# Patient Record
Sex: MALE | Race: BLACK OR AFRICAN AMERICAN
[De-identification: names, ages, dates, MRNs, and addresses within clinical notes are randomized per-mention and may not be internally consistent; named-entity substitution may affect disease eponyms.]

---

## 2019-02-22 ENCOUNTER — HOSPITAL ENCOUNTER (INPATIENT)
Dept: HOSPITAL 35 - ER | Age: 69
LOS: 11 days | Discharge: NURSING, REHAB OR LONG-TERM CARE | DRG: 239 | End: 2019-03-05
Attending: INTERNAL MEDICINE | Admitting: INTERNAL MEDICINE
Payer: COMMERCIAL

## 2019-02-22 VITALS — SYSTOLIC BLOOD PRESSURE: 122 MMHG | DIASTOLIC BLOOD PRESSURE: 64 MMHG

## 2019-02-22 VITALS — HEIGHT: 67.99 IN | WEIGHT: 170 LBS | BODY MASS INDEX: 25.76 KG/M2

## 2019-02-22 VITALS — DIASTOLIC BLOOD PRESSURE: 56 MMHG | SYSTOLIC BLOOD PRESSURE: 127 MMHG

## 2019-02-22 VITALS — DIASTOLIC BLOOD PRESSURE: 81 MMHG | SYSTOLIC BLOOD PRESSURE: 118 MMHG

## 2019-02-22 DIAGNOSIS — D63.8: ICD-10-CM

## 2019-02-22 DIAGNOSIS — Z79.899: ICD-10-CM

## 2019-02-22 DIAGNOSIS — L97.518: ICD-10-CM

## 2019-02-22 DIAGNOSIS — M86.8X7: ICD-10-CM

## 2019-02-22 DIAGNOSIS — E11.22: ICD-10-CM

## 2019-02-22 DIAGNOSIS — Z89.612: ICD-10-CM

## 2019-02-22 DIAGNOSIS — E11.621: ICD-10-CM

## 2019-02-22 DIAGNOSIS — I70.201: ICD-10-CM

## 2019-02-22 DIAGNOSIS — E43: ICD-10-CM

## 2019-02-22 DIAGNOSIS — E78.5: ICD-10-CM

## 2019-02-22 DIAGNOSIS — Z79.82: ICD-10-CM

## 2019-02-22 DIAGNOSIS — N17.9: ICD-10-CM

## 2019-02-22 DIAGNOSIS — G92: ICD-10-CM

## 2019-02-22 DIAGNOSIS — E11.40: ICD-10-CM

## 2019-02-22 DIAGNOSIS — N18.9: ICD-10-CM

## 2019-02-22 DIAGNOSIS — E11.65: ICD-10-CM

## 2019-02-22 DIAGNOSIS — Z79.4: ICD-10-CM

## 2019-02-22 DIAGNOSIS — Z87.891: ICD-10-CM

## 2019-02-22 DIAGNOSIS — E11.52: Primary | ICD-10-CM

## 2019-02-22 DIAGNOSIS — E11.69: ICD-10-CM

## 2019-02-22 DIAGNOSIS — I12.9: ICD-10-CM

## 2019-02-22 LAB
ALBUMIN SERPL-MCNC: 1.7 G/DL (ref 3.4–5)
ALT SERPL-CCNC: 9 U/L (ref 30–65)
ANION GAP SERPL CALC-SCNC: 5 MMOL/L (ref 7–16)
AST SERPL-CCNC: 16 U/L (ref 15–37)
BASOPHILS NFR BLD AUTO: 2.8 % (ref 0–2)
BILIRUB SERPL-MCNC: 0.2 MG/DL
BUN SERPL-MCNC: 20 MG/DL (ref 7–18)
CALCIUM SERPL-MCNC: 8.9 MG/DL (ref 8.5–10.1)
CHLORIDE SERPL-SCNC: 104 MMOL/L (ref 98–107)
CO2 SERPL-SCNC: 31 MMOL/L (ref 21–32)
CREAT SERPL-MCNC: 1.4 MG/DL (ref 0.7–1.3)
EOSINOPHIL NFR BLD: 2 % (ref 0–3)
ERYTHROCYTE [DISTWIDTH] IN BLOOD BY AUTOMATED COUNT: 17.2 % (ref 10.5–14.5)
GLUCOSE SERPL-MCNC: 188 MG/DL (ref 74–106)
GRANULOCYTES NFR BLD MANUAL: 59 % (ref 36–66)
HCT VFR BLD CALC: 25.8 % (ref 42–52)
HGB BLD-MCNC: 8.8 GM/DL (ref 14–18)
LYMPHOCYTES NFR BLD AUTO: 29.8 % (ref 24–44)
MCH RBC QN AUTO: 30.2 PG (ref 26–34)
MCHC RBC AUTO-ENTMCNC: 34.1 G/DL (ref 28–37)
MCV RBC: 88.5 FL (ref 80–100)
MONOCYTES NFR BLD: 6.4 % (ref 1–8)
NEUTROPHILS # BLD: 5 THOU/UL (ref 1.4–8.2)
PLATELET # BLD: 382 THOU/UL (ref 150–400)
POTASSIUM SERPL-SCNC: 3.8 MMOL/L (ref 3.5–5.1)
PROT SERPL-MCNC: 8.2 G/DL (ref 6.4–8.2)
RBC # BLD AUTO: 2.91 MIL/UL (ref 4.5–6)
SODIUM SERPL-SCNC: 140 MMOL/L (ref 136–145)
WBC # BLD AUTO: 8.5 THOU/UL (ref 4–11)

## 2019-02-22 PROCEDURE — 50101: CPT

## 2019-02-22 PROCEDURE — 56527: CPT

## 2019-02-22 PROCEDURE — 57091: CPT

## 2019-02-22 PROCEDURE — 53000 INCISION OF URETHRA: CPT

## 2019-02-22 PROCEDURE — 62900: CPT

## 2019-02-22 PROCEDURE — 10040 EXTRACTION: CPT

## 2019-02-22 PROCEDURE — 51412: CPT

## 2019-02-22 PROCEDURE — 70005: CPT

## 2019-02-22 PROCEDURE — 56524: CPT

## 2019-02-22 PROCEDURE — 50386 REMOVE STENT VIA TRANSURETH: CPT

## 2019-02-22 PROCEDURE — 56525: CPT

## 2019-02-22 PROCEDURE — 62110: CPT

## 2019-02-22 NOTE — NUR
Pt. admitted to the unit from the emergency room accompanied by staff.  He
offers no c/o pain.  Admission assessment and history is completed.
Requesting something to eat and box lunch given.  Bed alarm is on.

## 2019-02-23 VITALS — SYSTOLIC BLOOD PRESSURE: 161 MMHG | DIASTOLIC BLOOD PRESSURE: 67 MMHG

## 2019-02-23 VITALS — SYSTOLIC BLOOD PRESSURE: 123 MMHG | DIASTOLIC BLOOD PRESSURE: 55 MMHG

## 2019-02-23 VITALS — DIASTOLIC BLOOD PRESSURE: 58 MMHG | SYSTOLIC BLOOD PRESSURE: 127 MMHG

## 2019-02-23 VITALS — SYSTOLIC BLOOD PRESSURE: 126 MMHG | DIASTOLIC BLOOD PRESSURE: 68 MMHG

## 2019-02-23 LAB
ANION GAP SERPL CALC-SCNC: 6 MMOL/L (ref 7–16)
BUN SERPL-MCNC: 17 MG/DL (ref 7–18)
CALCIUM SERPL-MCNC: 8.5 MG/DL (ref 8.5–10.1)
CHLORIDE SERPL-SCNC: 105 MMOL/L (ref 98–107)
CO2 SERPL-SCNC: 28 MMOL/L (ref 21–32)
CREAT SERPL-MCNC: 1.3 MG/DL (ref 0.7–1.3)
ERYTHROCYTE [DISTWIDTH] IN BLOOD BY AUTOMATED COUNT: 17.3 % (ref 10.5–14.5)
GLUCOSE SERPL-MCNC: 248 MG/DL (ref 74–106)
HCT VFR BLD CALC: 24.6 % (ref 42–52)
HGB BLD-MCNC: 8 GM/DL (ref 14–18)
INR PPP: 1.2
MCH RBC QN AUTO: 28.9 PG (ref 26–34)
MCHC RBC AUTO-ENTMCNC: 32.4 G/DL (ref 28–37)
MCV RBC: 89.1 FL (ref 80–100)
PLATELET # BLD: 349 THOU/UL (ref 150–400)
POTASSIUM SERPL-SCNC: 3.9 MMOL/L (ref 3.5–5.1)
PROTHROMBIN TIME: 12.8 SECONDS (ref 9.3–11.4)
RBC # BLD AUTO: 2.76 MIL/UL (ref 4.5–6)
SODIUM SERPL-SCNC: 139 MMOL/L (ref 136–145)
WBC # BLD AUTO: 7 THOU/UL (ref 4–11)

## 2019-02-23 NOTE — NUR
WOUND CARE: WET TO DRY, ABX, KERLIX. PHYSICIAN ASSESSED, DIET GRANTED, PT
DELIGHTED, WATCHING BIG 12 ALL DAY, CATCHING UP W/HEALTHY SNACKS

## 2019-02-24 VITALS — DIASTOLIC BLOOD PRESSURE: 74 MMHG | SYSTOLIC BLOOD PRESSURE: 128 MMHG

## 2019-02-24 VITALS — DIASTOLIC BLOOD PRESSURE: 63 MMHG | SYSTOLIC BLOOD PRESSURE: 124 MMHG

## 2019-02-24 VITALS — DIASTOLIC BLOOD PRESSURE: 67 MMHG | SYSTOLIC BLOOD PRESSURE: 133 MMHG

## 2019-02-24 VITALS — SYSTOLIC BLOOD PRESSURE: 125 MMHG | DIASTOLIC BLOOD PRESSURE: 71 MMHG

## 2019-02-24 LAB
EST. AVERAGE GLUCOSE BLD GHB EST-MCNC: 174 MG/DL
GLYCOHEMOGLOBIN (HGB A1C): 7.7 % (ref 4.8–5.6)

## 2019-02-24 NOTE — NUR
PT ALERT AND ORIENTED TIMES THREE, WITH PERIODS OF CONFUSION. VSS, 98%RA, IVF
INFUSING PER ORDER. DRESSING TO RIGHT FOOT CHANGED THIS SHIFT SMALL AMOUNT OF
BLODDY DRAINAGE NOTED. PT DENIES PAIN/SOA. PT TOLERATES MEDS AND MEALS. WILL
CONTINUE TO MONITOR.

## 2019-02-24 NOTE — NUR
PT DENIED PAIN,N/V SO FAR.REPOSITIONED WHILE IN BED PER PT'S REQUEST.DRSG TO
HIS R FOOT C/D/I.IV FLUID AND IV ABX ADMINISTERED AS ORDERED.PO FLUIDS
ENCOURAGED.URINAL AT BEDSIDE,ADEQUATE OUTPUT NOTED.PT RESTING ON HIS BED AT
THIS TIME.CALL LIGHT WITHIN REACH.

## 2019-02-25 VITALS — DIASTOLIC BLOOD PRESSURE: 73 MMHG | SYSTOLIC BLOOD PRESSURE: 138 MMHG

## 2019-02-25 VITALS — DIASTOLIC BLOOD PRESSURE: 73 MMHG | SYSTOLIC BLOOD PRESSURE: 143 MMHG

## 2019-02-25 VITALS — SYSTOLIC BLOOD PRESSURE: 135 MMHG | DIASTOLIC BLOOD PRESSURE: 76 MMHG

## 2019-02-25 VITALS — DIASTOLIC BLOOD PRESSURE: 66 MMHG | SYSTOLIC BLOOD PRESSURE: 145 MMHG

## 2019-02-25 NOTE — NUR
Assumed care at 1845. Pt resting in bed. Wet to dry dressing applied on wound.
Pictures on chart. Pt denies chest pain. No identified needs. Will continue to
monitor.

## 2019-02-25 NOTE — NUR
PT ADMITTED RELATED TO SOA,AMS. CM REVIEWED CHART AND SPOKE WITH CARE TEAM. CM
MET WITH PT AT BEDSIDE THIS DAY. PT IS A&O X4. CM ROLE INTRODUCED. PT
INDICATED HE HAD BEEN AT [facility]. PT INDICATED HE HAD BEEN
THERE FOR ABOUT 6 WEEKS AND THAT PRIOR TO THAT HE HAD BEEN AT [facility]. PT
INDICATED THAT HE PLANS TO RETURN TO [facility] ONCE MEDICALLY
STABLE. ANANTH WITH [employer] VISITED WITH PT AND CM PROVIDED HER WITH UPDATED
THIS AFTERNOON. CM TO FOLLOW AS INDICATED WITH DC PLANNING.

## 2019-02-25 NOTE — NUR
Assumed pt care this morning, pt was taken down to have an MRI of his right
foot. Pt would be confused at times. Blood sugars done and medication given.
Seen by wound care team, dressing changed. Pt refused to eat his dinner
claiming he does not eat chicken and pork, informed the pm nurse. Gave the pt
a boxed lunch. No issues or complaints verbalized by the pt at this time.

## 2019-02-26 VITALS — SYSTOLIC BLOOD PRESSURE: 130 MMHG | DIASTOLIC BLOOD PRESSURE: 72 MMHG

## 2019-02-26 VITALS — DIASTOLIC BLOOD PRESSURE: 73 MMHG | SYSTOLIC BLOOD PRESSURE: 128 MMHG

## 2019-02-26 VITALS — DIASTOLIC BLOOD PRESSURE: 63 MMHG | SYSTOLIC BLOOD PRESSURE: 124 MMHG

## 2019-02-26 VITALS — DIASTOLIC BLOOD PRESSURE: 64 MMHG | SYSTOLIC BLOOD PRESSURE: 122 MMHG

## 2019-02-26 LAB
ANION GAP SERPL CALC-SCNC: 4 MMOL/L (ref 7–16)
BASOPHILS NFR BLD AUTO: 0.6 % (ref 0–2)
BUN SERPL-MCNC: 13 MG/DL (ref 7–18)
CALCIUM SERPL-MCNC: 8.6 MG/DL (ref 8.5–10.1)
CHLORIDE SERPL-SCNC: 104 MMOL/L (ref 98–107)
CO2 SERPL-SCNC: 29 MMOL/L (ref 21–32)
CREAT SERPL-MCNC: 1.1 MG/DL (ref 0.7–1.3)
EOSINOPHIL NFR BLD: 1.9 % (ref 0–3)
ERYTHROCYTE [DISTWIDTH] IN BLOOD BY AUTOMATED COUNT: 16.7 % (ref 10.5–14.5)
GLOBULIN TOTAL: 4.6 G/DL (ref 2.2–3.9)
GLUCOSE SERPL-MCNC: 122 MG/DL (ref 74–106)
GRANULOCYTES NFR BLD MANUAL: 50.6 % (ref 36–66)
HCT VFR BLD CALC: 26.5 % (ref 42–52)
HGB BLD-MCNC: 8.7 GM/DL (ref 14–18)
LYMPHOCYTES NFR BLD AUTO: 36.6 % (ref 24–44)
MCH RBC QN AUTO: 29 PG (ref 26–34)
MCHC RBC AUTO-ENTMCNC: 32.7 G/DL (ref 28–37)
MCV RBC: 88.8 FL (ref 80–100)
MONOCYTES NFR BLD: 10.3 % (ref 1–8)
NEUTROPHILS # BLD: 3.9 THOU/UL (ref 1.4–8.2)
PLATELET # BLD: 385 THOU/UL (ref 150–400)
POTASSIUM SERPL-SCNC: 3.9 MMOL/L (ref 3.5–5.1)
RBC # BLD AUTO: 2.99 MIL/UL (ref 4.5–6)
SODIUM SERPL-SCNC: 137 MMOL/L (ref 136–145)
WBC # BLD AUTO: 7.7 THOU/UL (ref 4–11)

## 2019-02-26 NOTE — NUR
Pt. rested quietly during the night when checked on during frequent rounds.
He offers no c/o pain.  Dressing to his right lower leg is dry and intact.
Refuses to be turned and repositioned.  Bed alarm is on.

## 2019-02-26 NOTE — NUR
PT STABLE THROUGHOUT SHIFT. PT HAD LAVAGE TREATMENT WHICH HE TOLERATED WELL.
CHANGED DRESSING ON FOOT. PT RESTING COMFORTABLY.

## 2019-02-27 VITALS — SYSTOLIC BLOOD PRESSURE: 135 MMHG | DIASTOLIC BLOOD PRESSURE: 61 MMHG

## 2019-02-27 VITALS — SYSTOLIC BLOOD PRESSURE: 113 MMHG | DIASTOLIC BLOOD PRESSURE: 59 MMHG

## 2019-02-27 VITALS — SYSTOLIC BLOOD PRESSURE: 135 MMHG | DIASTOLIC BLOOD PRESSURE: 75 MMHG

## 2019-02-27 VITALS — DIASTOLIC BLOOD PRESSURE: 72 MMHG | SYSTOLIC BLOOD PRESSURE: 132 MMHG

## 2019-02-27 VITALS — SYSTOLIC BLOOD PRESSURE: 110 MMHG | DIASTOLIC BLOOD PRESSURE: 58 MMHG

## 2019-02-27 VITALS — SYSTOLIC BLOOD PRESSURE: 116 MMHG | DIASTOLIC BLOOD PRESSURE: 67 MMHG

## 2019-02-27 VITALS — SYSTOLIC BLOOD PRESSURE: 129 MMHG | DIASTOLIC BLOOD PRESSURE: 63 MMHG

## 2019-02-27 VITALS — SYSTOLIC BLOOD PRESSURE: 127 MMHG | DIASTOLIC BLOOD PRESSURE: 61 MMHG

## 2019-02-27 VITALS — DIASTOLIC BLOOD PRESSURE: 57 MMHG | SYSTOLIC BLOOD PRESSURE: 125 MMHG

## 2019-02-27 PROCEDURE — B4181ZZ FLUOROSCOPY OF BILATERAL RENAL ARTERIES USING LOW OSMOLAR CONTRAST: ICD-10-PCS | Performed by: RADIOLOGY

## 2019-02-27 PROCEDURE — B41D1ZZ FLUOROSCOPY OF AORTA AND BILATERAL LOWER EXTREMITY ARTERIES USING LOW OSMOLAR CONTRAST: ICD-10-PCS | Performed by: RADIOLOGY

## 2019-02-27 NOTE — NUR
Pt A&Ox4, VSS with no c/o of pain. No SOA or distress observed.
Right foot dressing intact. Left groin
dressing intact and no swelling. Pt left leg is straight and head up at the 30
degree limit. Pt has dinner and is resting. Will continue to monitor.

## 2019-02-27 NOTE — NUR
PATIENT AOX4 MAKES NEEDS KNOWN. PATIENT IS NPO SINCE MIDNIGHT PER DR. REYES.
RIGHT FOOT HAS A STRONG ODOR, DRESSING IS C/D/I. PATIENT DENIED PAIN OR
DISCOMFORT.  PATIENT REFUSED TO BE TURNED, PATIENT REFUSED THIS NURSE TO
REMOVE RIGHT FOOT PROSTHETIC.  PATIENT IN BED ASLEEP AT THIS TIME BREATHING
REGULAR AND UNLABOURED.

## 2019-02-27 NOTE — NUR
PT HAVING ARTERIOGRAM TODAY. CARE TEAM LOOKING AT POSSIBLE BKA FRIDAY. PT WILL
RETURN TO [facility] ONCE MEDICALLY STABLE. CM TO FOLLOW AS
INDICATED WITH DC PLANNING.

## 2019-02-28 VITALS — DIASTOLIC BLOOD PRESSURE: 67 MMHG | SYSTOLIC BLOOD PRESSURE: 127 MMHG

## 2019-02-28 VITALS — DIASTOLIC BLOOD PRESSURE: 65 MMHG | SYSTOLIC BLOOD PRESSURE: 119 MMHG

## 2019-02-28 VITALS — DIASTOLIC BLOOD PRESSURE: 64 MMHG | SYSTOLIC BLOOD PRESSURE: 151 MMHG

## 2019-02-28 VITALS — DIASTOLIC BLOOD PRESSURE: 69 MMHG | SYSTOLIC BLOOD PRESSURE: 139 MMHG

## 2019-02-28 NOTE — NUR
ASSUMED CARE AT 0700. PT A&OX4. PT IS FUSSY TODAY. ORTHO DR GUERRA AND PT
TOLD HER HE DID NOT AGREE THAT HE NEEDED AMPUTATION, PT PROCEEDED TO TELL
WOUND PHYSICIAN THAT HE THOUGHT HE WOULD TRY MORE WOUND CARE AND NO
AMPUTATION. ID PHYSICIAN SPOKE AT LENGTH OF SEVERITY OF WOUNDS AND NEEDS AND
PT STATES HE MAKE A DECISION "SOON". PT DOES NOT WANT TO TURN AND GETS VERY
UPSET WHEN YOU ASK HIM TO. PT REFUSED BATH AFTER MUCH ENCOURAGEMENT.

## 2019-02-28 NOTE — NUR
WOUND CONSULT:
PT. WAS SEEN TODAY BY DR. MONZON AND MYSELF.  PT. HAS A VASCULAR WOUND TO HIS
RIGHT FOOT.  PT. HAS HAD A FAILED TRANSMET AND THERE IS EXPOSED DEAD BONE.
PT. TOE THAT IS LEFT IS DRY, DEAD AND NECROTIC. THE PLANTER SURFACE OF THE
FOOT IS COVER IN SLOUGH AND A FOUL ODOR IS NOTED AT THIS TIME.  PT. HAS
CONFIRED OSTEO ALONG WITH A SIGNIFICANT INFECTION.  ANGIOGRAM WAS ATTEMPTED
YESTERDAY AND THERE IS NOT VIABLE OPTIONS FOR BLOOD RESTORATION TO AID IN
BLOOD FLOW.  PT. HAS BEEN SEEN BY WOUND CARE, ID AND ORTHO.  ALL HAVE
EXPLAINED TO THE PT. THAT HE NEEDS TO HAVE AN AMPUTATION AND PT. AT THIS TIME
IS REFUSING TO HAVE THIS PROCEDURE.  PT. HAS BEEN EXPLAINED ON THE RISK AND
BENIFITS OF HIS CHOICE.
 
RECOMMENDATIONS:
DAKIN'S MOIST GAUZE DRESSING BID AT THIS TIME.
 
PT. AND STAFF NURSE WERE INSTRUCTED ON PLAN OF CARE.

## 2019-03-01 VITALS — DIASTOLIC BLOOD PRESSURE: 60 MMHG | SYSTOLIC BLOOD PRESSURE: 127 MMHG

## 2019-03-01 VITALS — SYSTOLIC BLOOD PRESSURE: 145 MMHG | DIASTOLIC BLOOD PRESSURE: 82 MMHG

## 2019-03-01 VITALS — SYSTOLIC BLOOD PRESSURE: 128 MMHG | DIASTOLIC BLOOD PRESSURE: 56 MMHG

## 2019-03-01 VITALS — SYSTOLIC BLOOD PRESSURE: 142 MMHG | DIASTOLIC BLOOD PRESSURE: 63 MMHG

## 2019-03-01 NOTE — NUR
PT ANGRY AND AGITATED WITH STAFF PT WAS ABLE TO SLEEP ON AND OFF NO COMPLAINTS
OF PAIN PT USED CALL LIGHT EFFECTIVELY.

## 2019-03-01 NOTE — NUR
CALL RECEIVED FROM ANANTH [facility].  REQUESTING CLINICAL UPDATES FOR
PATIENT.  PRINTED AND FAXED TO HER, VERIFIED RECEIVED.  PATIENT IS FROM
[facility], PLAN IS FOR PATIENT TO RETURN ONCE
MEDICALLY READY.  FOLLOWING TO ASSIST WITH DISCHARGE NEED.  UNIT SW AWARE.

## 2019-03-01 NOTE — NUR
Assumed pt care this am, wound care done and dressing changed. Pt refused a
bed bath. Pt agreed to have right bka, consents signed attached to the chart.
Blood sugars done and all medication given as per poc. Pt is to be NPO this
midnight in preparation for the procedure rose. No issues or concerned voiced.
POC followed

## 2019-03-01 NOTE — NUR
WOUND FOLLOW UP:
PT. WAS SEEN TODAY BY DR. VILLALTA AND MYSELF.  PT. IS NOW AGREEABLE TO
PROCEED WITH AN RIGHT BKA.
 
RECOMMENDATIONS:
CONTINUE WITH CURRENT PLAN OF CARE.
 
PT. AND STAFF NURSE WERE INSTRUCTED ON PLAN OF CARE.

## 2019-03-02 VITALS — DIASTOLIC BLOOD PRESSURE: 59 MMHG | SYSTOLIC BLOOD PRESSURE: 145 MMHG

## 2019-03-02 VITALS — DIASTOLIC BLOOD PRESSURE: 72 MMHG | SYSTOLIC BLOOD PRESSURE: 148 MMHG

## 2019-03-02 VITALS — SYSTOLIC BLOOD PRESSURE: 126 MMHG | DIASTOLIC BLOOD PRESSURE: 61 MMHG

## 2019-03-02 VITALS — SYSTOLIC BLOOD PRESSURE: 137 MMHG | DIASTOLIC BLOOD PRESSURE: 69 MMHG

## 2019-03-02 VITALS — SYSTOLIC BLOOD PRESSURE: 131 MMHG | DIASTOLIC BLOOD PRESSURE: 67 MMHG

## 2019-03-02 VITALS — DIASTOLIC BLOOD PRESSURE: 69 MMHG | SYSTOLIC BLOOD PRESSURE: 127 MMHG

## 2019-03-02 VITALS — DIASTOLIC BLOOD PRESSURE: 82 MMHG | SYSTOLIC BLOOD PRESSURE: 155 MMHG

## 2019-03-02 VITALS — SYSTOLIC BLOOD PRESSURE: 153 MMHG | DIASTOLIC BLOOD PRESSURE: 68 MMHG

## 2019-03-02 LAB
ALBUMIN SERPL-MCNC: 1.4 G/DL (ref 3.4–5)
ALT SERPL-CCNC: 11 U/L (ref 30–65)
ANION GAP SERPL CALC-SCNC: 5 MMOL/L (ref 7–16)
AST SERPL-CCNC: 13 U/L (ref 15–37)
BASOPHILS NFR BLD AUTO: 0.5 % (ref 0–2)
BILIRUB SERPL-MCNC: 0.1 MG/DL
BUN SERPL-MCNC: 11 MG/DL (ref 7–18)
CALCIUM SERPL-MCNC: 8.3 MG/DL (ref 8.5–10.1)
CHLORIDE SERPL-SCNC: 106 MMOL/L (ref 98–107)
CO2 SERPL-SCNC: 28 MMOL/L (ref 21–32)
CREAT SERPL-MCNC: 0.9 MG/DL (ref 0.7–1.3)
EOSINOPHIL NFR BLD: 2.4 % (ref 0–3)
ERYTHROCYTE [DISTWIDTH] IN BLOOD BY AUTOMATED COUNT: 17.2 % (ref 10.5–14.5)
GLUCOSE SERPL-MCNC: 108 MG/DL (ref 74–106)
GRANULOCYTES NFR BLD MANUAL: 56.2 % (ref 36–66)
HCT VFR BLD CALC: 24.1 % (ref 42–52)
HGB BLD-MCNC: 8.1 GM/DL (ref 14–18)
INR PPP: 1.2
LYMPHOCYTES NFR BLD AUTO: 31 % (ref 24–44)
MCH RBC QN AUTO: 29.5 PG (ref 26–34)
MCHC RBC AUTO-ENTMCNC: 33.5 G/DL (ref 28–37)
MCV RBC: 88.2 FL (ref 80–100)
MONOCYTES NFR BLD: 9.9 % (ref 1–8)
NEUTROPHILS # BLD: 4 THOU/UL (ref 1.4–8.2)
PLATELET # BLD: 350 THOU/UL (ref 150–400)
POTASSIUM SERPL-SCNC: 3.7 MMOL/L (ref 3.5–5.1)
PROT SERPL-MCNC: 6.9 G/DL (ref 6.4–8.2)
PROTHROMBIN TIME: 12.3 SECONDS (ref 9.3–11.4)
RBC # BLD AUTO: 2.73 MIL/UL (ref 4.5–6)
SODIUM SERPL-SCNC: 139 MMOL/L (ref 136–145)
WBC # BLD AUTO: 7.1 THOU/UL (ref 4–11)

## 2019-03-02 PROCEDURE — 0Y6H0Z3 DETACHMENT AT RIGHT LOWER LEG, LOW, OPEN APPROACH: ICD-10-PCS | Performed by: ORTHOPAEDIC SURGERY

## 2019-03-02 PROCEDURE — 0KUQ07Z SUPPLEMENT RIGHT UPPER LEG MUSCLE WITH AUTOLOGOUS TISSUE SUBSTITUTE, OPEN APPROACH: ICD-10-PCS | Performed by: ORTHOPAEDIC SURGERY

## 2019-03-02 NOTE — NUR
Pt is on NPO since midnight, asleep when received on shift changed. Pt has not
been taken down to OR for the amputation.

## 2019-03-02 NOTE — NUR
A/O, calm and cooperative; vss, afebrile; no n/v. Hg 8.1 this AM. patient is
aware of the coming surgery. NPO after midnight, tolerated well.

## 2019-03-03 VITALS — SYSTOLIC BLOOD PRESSURE: 134 MMHG | DIASTOLIC BLOOD PRESSURE: 44 MMHG

## 2019-03-03 VITALS — DIASTOLIC BLOOD PRESSURE: 72 MMHG | SYSTOLIC BLOOD PRESSURE: 143 MMHG

## 2019-03-03 VITALS — SYSTOLIC BLOOD PRESSURE: 125 MMHG | DIASTOLIC BLOOD PRESSURE: 48 MMHG

## 2019-03-03 VITALS — SYSTOLIC BLOOD PRESSURE: 136 MMHG | DIASTOLIC BLOOD PRESSURE: 67 MMHG

## 2019-03-03 LAB
HCT VFR BLD CALC: 28.2 % (ref 42–52)
HGB BLD-MCNC: 9.6 GM/DL (ref 14–18)

## 2019-03-03 NOTE — NUR
PROGRESS
 
PT ALERT AND RESPONSIVE, VSS IVF'S INFUSING WITHOUT DIFFICULTY. REFUSED PAIN
MEDICATION. REQUESTED POTATO CHIPS SLEPT MOST OF SHIFT. REPOSITIONED SELF.
CONTINUE TO MONITOR

## 2019-03-03 NOTE — NUR
ASSUMED CARE AT 0700. AXOX4. R BKA SITE DRESSING DRY AND INTACT. PER ORTHO EMILY KIM, LEAVE THE DRESSING IN TILL TOMORROW AND ORTHO WILL EVAL AND GIVE
FURTHER RECOMMENDATION FOR CARE. DIET ADVANCED TO HEART HEALTHY.  ID 
D/C JELANI. STILL ON IVF. NO S/S ACUTE DISTRESS NOTED OR REPORTED AT THIS
TIME. WILL CONT TO MONITOR ANY CHANGES.

## 2019-03-04 VITALS — SYSTOLIC BLOOD PRESSURE: 146 MMHG | DIASTOLIC BLOOD PRESSURE: 68 MMHG

## 2019-03-04 VITALS — SYSTOLIC BLOOD PRESSURE: 131 MMHG | DIASTOLIC BLOOD PRESSURE: 56 MMHG

## 2019-03-04 VITALS — SYSTOLIC BLOOD PRESSURE: 145 MMHG | DIASTOLIC BLOOD PRESSURE: 75 MMHG

## 2019-03-04 VITALS — SYSTOLIC BLOOD PRESSURE: 141 MMHG | DIASTOLIC BLOOD PRESSURE: 72 MMHG

## 2019-03-04 NOTE — NUR
PROGRESS
 
UNEVENTFUL NIGHT. PT DENIES PAIN, REFUSED TURNS BUT ATTEMPTED TO REPOSITION
SELF WHEN REQUESTED. IVF'S INFUSING AS ORDERED, HYDRALAZINE HELD D/T LOW BP.
ON RA TELE INTACT CONTINUE POC.

## 2019-03-04 NOTE — NUR
WOUND FOLLOW UP:
PT. WAS SEEN TODAY BY DR. VILLALTA AND MYSELF.  PT. UNDERWENT A RIGHT BKA ON
3/2/19.  PT. SURGICAL DRESSING WAS CHANGED TODAY BY WOUND CARE.  INCSION IS
WELL APPROXIMATED AND FREE OF ANY SIGNS OR SYMPTOMS OF INFECTION AT THIS TIME.
PER ORTHO WOUND CARE WILL PULL THE PINROSE DRAIN TOMORROW.
 
RECOMMENDATIONS:
CONTINUE WITH CURRENT PLAN OF CARE.
 
PT. AND STAFF NURSE WERE INSTRUCTED ON PLAN OF CARE.

## 2019-03-04 NOTE — NUR
PT AND OT TO EVAL PT. CLINICAL UPDATES SENT TO [facility] THIS DAY.
IT IS ANTICPATED THAT PT WILL RETURN TO [facility] FOR SKILLED
REHAB SERVICES ONCE MEDICALLY STABLE. CM TO FOLLOW AS INDICATED WITH DC
PLANNING.

## 2019-03-04 NOTE — NUR
ASSUME PT CARE AT 0700. VSS ON RA. BS MONITOR INSULIN AND B/P MEDICATIONS
GIVEN AT ORDERED. PT IS CONTINUE TO BE ON NS 0.9% 75ML/HR. B/P HAS BEEN ABOUT
141/72 - 140/75. ENCOURAGED PT TO DRINK MORE SO HE DOESN'T NEED TO BE ON
FLUID. PHYSICAL THERAPIST WORKED WITH PT AND GOT PT UP TO RECLINER. NO
TRANSFER EXCEPT PT AT THIS MOMENT. WOUND NURSE CAME TO CHANGE SURGICAL
DRESSING THIS AM.  CAME TO GIVE PT  STUMP. PT IS MORE CALM AND
COOPERATIVE TODAY, BUT REFUSED TAKING BATH BY PCA TODAY. USES URINAL AT
BEDSIDE. HAD ONE INCONT THIS AM WHEN PHYSICAL THERAPIST GOT HIM UP TO
RECLINER. DENIES PAIN. OFFERED SUPPORTIVE CARE. CHANGE DRESSING ON IV ON RIGHT
AC. ENCOURAGED PT TO CHANGE POSITION IN BED. FALL PRECAUTION IN PLACE.
REASSESSMENT PER CHART. LABS REVIEWED. HGB IS BETTER 9.6. CALL LIGHT WITHIN
REACH. ENCOURAGED PT TO VOICE HIS NEEDS. REPORTS SLEPT WELL LAST NIGHT.

## 2019-03-05 VITALS — DIASTOLIC BLOOD PRESSURE: 75 MMHG | SYSTOLIC BLOOD PRESSURE: 140 MMHG

## 2019-03-05 VITALS — DIASTOLIC BLOOD PRESSURE: 79 MMHG | SYSTOLIC BLOOD PRESSURE: 150 MMHG

## 2019-03-05 NOTE — NUR
WOUND FOLLOW UP:
PT. WAS SEEN TODAY BY DR. TOMAS AND MYSELF.  PT. PENROSE DRAIN WAS REMOVED
TODAY BY WOUND CARE.  SURGICAL INCSION IS WELL APPROXIMATED AND PT. TOLERATED
PROCEDURE AND DRESSING CHANGE WELL.
 
RECOMMENDATIONS:
CONTINUE WITH CURRENT PLAN OF CARE.
 
PT. AND STAFF NURSE WERE INSTRUCTED ON PLAN OF CARE.

## 2019-03-05 NOTE — NUR
PROGRESS
 
PT ALERT AND ORIENTED, DENIEES PAIN, REFUSING ASSISTANCE REPOSITIONING BUT
STATES HE REPOSITIONS SELF BUT CONSISTENTLY SUPINE. REFUSES PAIN MEDS, DRSG TO
RIGHT STUMP C/D/I STUMP  IN PLACE CONTINUE POC.

## 2019-03-05 NOTE — NUR
patient will dc today to [facility], Discharge papers faxed to
[facility]LAN and to  [facility] on 4w to include in cc.  Pickup time is 130
to 2pm today marcel thomas.  No family to notifiy.  DP called [facility] to confirm pu
time and let her know to expect dc papers and to call if not received.

## 2019-03-05 NOTE — NUR
CARE TEAM INDICATED THAT PT IS MEDICALLY STABLE TO DISCHARGE BACK TO [facility] TODAY. CM NOTIFIED LIAISON ANANTH AND TERESA VAN TRANSPORT WAS
ARRANGED BETWEEM 1330 AND 1430. CHART COPY ORDERED. ORFERS WERE FACES. SHART
COPY ORDERED. REPORT TO BE CALLED TO [phone]. PT IS AWARE AND AGREEABLE,
NO OTHER CM IINTERVENTION INDICATED AT THIS TIME. CASE CLOSED.

## 2019-03-05 NOTE — NUR
ASSUMED CARE AT 0700. AXOX4. SEEN BY  AT BEDSIDE. RECEIVED AN ORDER
TO D/C BACK TO [facility]. REPORT CALLED TO [facility]. DRESSING ON R
BKA SITE RENDERED WITH WOUND CARE TEAM. PENROSE DRESSING REMOVED BY WOUND
CARE AND NOW SX WITH STUMP . VSS. IV REMOVED. NO S/S ACUTE DISTRESS
NOTED OR REPORTED UPON DEPARTURE. CHART COPY AND PRESCRIPTIONS SEND WITH PT AS
WELL AS BELONGINGS. LEFT THE FLOOR IN STABLE CONDITION.

## 2020-11-04 ENCOUNTER — HOSPITAL ENCOUNTER (EMERGENCY)
Dept: HOSPITAL 35 - ER | Age: 70
Discharge: NURSING, REHAB OR LONG-TERM CARE | End: 2020-11-04
Payer: COMMERCIAL

## 2020-11-04 VITALS — WEIGHT: 185.01 LBS | HEIGHT: 68 IN | BODY MASS INDEX: 28.04 KG/M2

## 2020-11-04 VITALS — SYSTOLIC BLOOD PRESSURE: 135 MMHG | DIASTOLIC BLOOD PRESSURE: 70 MMHG

## 2020-11-04 DIAGNOSIS — Z89.511: ICD-10-CM

## 2020-11-04 DIAGNOSIS — N18.9: ICD-10-CM

## 2020-11-04 DIAGNOSIS — I12.9: Primary | ICD-10-CM

## 2020-11-04 DIAGNOSIS — R10.9: ICD-10-CM

## 2020-11-04 DIAGNOSIS — Z79.899: ICD-10-CM

## 2020-11-04 DIAGNOSIS — Z79.82: ICD-10-CM

## 2020-11-04 DIAGNOSIS — Z79.4: ICD-10-CM

## 2020-11-04 DIAGNOSIS — Z88.0: ICD-10-CM

## 2020-11-04 DIAGNOSIS — R42: ICD-10-CM

## 2020-11-04 DIAGNOSIS — Z89.512: ICD-10-CM

## 2020-11-04 DIAGNOSIS — E11.22: ICD-10-CM

## 2020-11-04 DIAGNOSIS — Z87.891: ICD-10-CM

## 2020-11-04 DIAGNOSIS — R19.7: ICD-10-CM

## 2020-11-04 LAB
ALBUMIN SERPL-MCNC: 2.9 G/DL (ref 3.4–5)
ALT SERPL-CCNC: 15 U/L (ref 30–65)
ANION GAP SERPL CALC-SCNC: 6 MMOL/L (ref 7–16)
AST SERPL-CCNC: 23 U/L (ref 15–37)
BASOPHILS NFR BLD AUTO: 1.1 % (ref 0–2)
BILIRUB SERPL-MCNC: 0.3 MG/DL (ref 0.2–1)
BUN SERPL-MCNC: 11 MG/DL (ref 7–18)
CALCIUM SERPL-MCNC: 9.2 MG/DL (ref 8.5–10.1)
CHLORIDE SERPL-SCNC: 102 MMOL/L (ref 98–107)
CO2 SERPL-SCNC: 29 MMOL/L (ref 21–32)
CREAT SERPL-MCNC: 1.4 MG/DL (ref 0.7–1.3)
EOSINOPHIL NFR BLD: 1.3 % (ref 0–3)
ERYTHROCYTE [DISTWIDTH] IN BLOOD BY AUTOMATED COUNT: 14.2 % (ref 10.5–14.5)
GLUCOSE SERPL-MCNC: 245 MG/DL (ref 74–106)
GRANULOCYTES NFR BLD MANUAL: 70.2 % (ref 36–66)
HCT VFR BLD CALC: 43.8 % (ref 42–52)
HGB BLD-MCNC: 14.7 GM/DL (ref 14–18)
LYMPHOCYTES NFR BLD AUTO: 20.6 % (ref 24–44)
MCH RBC QN AUTO: 31.3 PG (ref 26–34)
MCHC RBC AUTO-ENTMCNC: 33.5 G/DL (ref 28–37)
MCV RBC: 93.5 FL (ref 80–100)
MONOCYTES NFR BLD: 6.8 % (ref 1–8)
NEUTROPHILS # BLD: 5.2 THOU/UL (ref 1.4–8.2)
PLATELET # BLD: 249 THOU/UL (ref 150–400)
POTASSIUM SERPL-SCNC: 4 MMOL/L (ref 3.5–5.1)
PROT SERPL-MCNC: 7.5 G/DL (ref 6.4–8.2)
RBC # BLD AUTO: 4.68 MIL/UL (ref 4.5–6)
SODIUM SERPL-SCNC: 137 MMOL/L (ref 136–145)
TROPONIN I SERPL-MCNC: <0.06 NG/ML (ref ?–0.06)
WBC # BLD AUTO: 7.4 THOU/UL (ref 4–11)

## 2020-11-05 NOTE — EKG
[facility]
[place]   [address]                     ELECTROCARDIOGRAM REPORT      
_______________________________________________________________________________
 
Name:       YULI VAZQUEZ                 [facility]                     [id number]                       Account #:      [id number]  
Admission:  20    Attend Phys:                          
Discharge:  20    Date of Birth:  [birth year]  
                                                          Report #: [id number]
                                                                    [id number]
_______________________________________________________________________________
THIS REPORT FOR:  
 
cc:  Hayden Florence James D. DO Santiago, Patrick MD [facility]                                         ~
THIS REPORT FOR:   //name//                          
 
                         [facility] ED
                                       
Test Date:    2020               Test Time:    19:13:38
Pat Name:     YULI VAZQUEZ            Department:   
Patient ID:   [id number]            Room:          
Gender:                               Technician:   alfonso
:          1950               Requested By: Victorina Lopez
Order Number: [id number] MD:   Cleveland Antonio
                                 Measurements
Intervals                              Axis          
Rate:         73                       P:            39
DC:           158                      QRS:          -5
QRSD:         79                       T:            178
QT:           367                                    
QTc:          405                                    
                           Interpretive Statements
Sinus rhythm
Abnormal T, consider ischemia, lateral leads
Baseline wander in lead(s) II
Compared to ECG 2019 18:06:04
T-wave abnormality now present
Possible ischemia now present
Electronically Signed On 2020 7:38:02 CST by Cleveland Antonio
[email or web address]
 
 
 
 
 
 
 
 
 
 
 
 
 
  <ELECTRONICALLY SIGNED>
   By: Cleveland Antonio MD, FACC    
  20     0738
D: 20                           _____________________________________
T: 20                           Cleveland Antonio MD, FAC      /EPI